# Patient Record
Sex: MALE | Race: WHITE | NOT HISPANIC OR LATINO | Employment: FULL TIME | URBAN - METROPOLITAN AREA
[De-identification: names, ages, dates, MRNs, and addresses within clinical notes are randomized per-mention and may not be internally consistent; named-entity substitution may affect disease eponyms.]

---

## 2023-01-09 ENCOUNTER — HOSPITAL ENCOUNTER (EMERGENCY)
Facility: HOSPITAL | Age: 38
Discharge: HOME | End: 2023-01-10
Attending: EMERGENCY MEDICINE | Admitting: EMERGENCY MEDICINE
Payer: COMMERCIAL

## 2023-01-09 ENCOUNTER — APPOINTMENT (OUTPATIENT)
Dept: GENERAL RADIOLOGY | Facility: HOSPITAL | Age: 38
End: 2023-01-09
Payer: COMMERCIAL

## 2023-01-09 DIAGNOSIS — R55 NEAR SYNCOPE: ICD-10-CM

## 2023-01-09 DIAGNOSIS — R42 LIGHTHEADEDNESS: Primary | ICD-10-CM

## 2023-01-09 LAB
ALBUMIN SERPL-MCNC: 4.7 G/DL (ref 3.5–5.2)
ALBUMIN/GLOB SERPL: 2.5 G/DL
ALP SERPL-CCNC: 78 U/L (ref 39–117)
ALT SERPL W P-5'-P-CCNC: 21 U/L (ref 1–41)
ANION GAP SERPL CALCULATED.3IONS-SCNC: 10 MMOL/L (ref 5–15)
AST SERPL-CCNC: 18 U/L (ref 1–40)
BASOPHILS # BLD AUTO: 0.02 10*3/MM3 (ref 0–0.2)
BASOPHILS NFR BLD AUTO: 0.3 % (ref 0–1.5)
BILIRUB SERPL-MCNC: 0.4 MG/DL (ref 0–1.2)
BUN SERPL-MCNC: 16 MG/DL (ref 6–20)
BUN/CREAT SERPL: 20.3 (ref 7–25)
CALCIUM SPEC-SCNC: 9.2 MG/DL (ref 8.6–10.5)
CHLORIDE SERPL-SCNC: 105 MMOL/L (ref 98–107)
CO2 SERPL-SCNC: 24 MMOL/L (ref 22–29)
CREAT SERPL-MCNC: 0.79 MG/DL (ref 0.76–1.27)
D DIMER PPP FEU-MCNC: <0.27 MCGFEU/ML (ref 0–0.5)
DEPRECATED RDW RBC AUTO: 40.5 FL (ref 37–54)
EGFRCR SERPLBLD CKD-EPI 2021: 116.6 ML/MIN/1.73
EOSINOPHIL # BLD AUTO: 0.06 10*3/MM3 (ref 0–0.4)
EOSINOPHIL NFR BLD AUTO: 0.8 % (ref 0.3–6.2)
ERYTHROCYTE [DISTWIDTH] IN BLOOD BY AUTOMATED COUNT: 13 % (ref 12.3–15.4)
GLOBULIN UR ELPH-MCNC: 1.9 GM/DL
GLUCOSE SERPL-MCNC: 93 MG/DL (ref 65–99)
HCT VFR BLD AUTO: 42.1 % (ref 37.5–51)
HGB BLD-MCNC: 14.7 G/DL (ref 13–17.7)
HOLD SPECIMEN: NORMAL
HOLD SPECIMEN: NORMAL
IMM GRANULOCYTES # BLD AUTO: 0.03 10*3/MM3 (ref 0–0.05)
IMM GRANULOCYTES NFR BLD AUTO: 0.4 % (ref 0–0.5)
LYMPHOCYTES # BLD AUTO: 1.95 10*3/MM3 (ref 0.7–3.1)
LYMPHOCYTES NFR BLD AUTO: 26.8 % (ref 19.6–45.3)
MCH RBC QN AUTO: 30.4 PG (ref 26.6–33)
MCHC RBC AUTO-ENTMCNC: 34.9 G/DL (ref 31.5–35.7)
MCV RBC AUTO: 87 FL (ref 79–97)
MONOCYTES # BLD AUTO: 0.57 10*3/MM3 (ref 0.1–0.9)
MONOCYTES NFR BLD AUTO: 7.8 % (ref 5–12)
NEUTROPHILS NFR BLD AUTO: 4.64 10*3/MM3 (ref 1.7–7)
NEUTROPHILS NFR BLD AUTO: 63.9 % (ref 42.7–76)
NRBC BLD AUTO-RTO: 0 /100 WBC (ref 0–0.2)
PLATELET # BLD AUTO: 198 10*3/MM3 (ref 140–450)
PMV BLD AUTO: 11 FL (ref 6–12)
POTASSIUM SERPL-SCNC: 3.5 MMOL/L (ref 3.5–5.2)
PROT SERPL-MCNC: 6.6 G/DL (ref 6–8.5)
RBC # BLD AUTO: 4.84 10*6/MM3 (ref 4.14–5.8)
SODIUM SERPL-SCNC: 139 MMOL/L (ref 136–145)
WBC NRBC COR # BLD: 7.27 10*3/MM3 (ref 3.4–10.8)
WHOLE BLOOD HOLD COAG: NORMAL
WHOLE BLOOD HOLD SPECIMEN: NORMAL

## 2023-01-09 PROCEDURE — 85025 COMPLETE CBC W/AUTO DIFF WBC: CPT

## 2023-01-09 PROCEDURE — 0202U NFCT DS 22 TRGT SARS-COV-2: CPT | Performed by: PHYSICIAN ASSISTANT

## 2023-01-09 PROCEDURE — 71045 X-RAY EXAM CHEST 1 VIEW: CPT

## 2023-01-09 PROCEDURE — 93005 ELECTROCARDIOGRAM TRACING: CPT | Performed by: PHYSICIAN ASSISTANT

## 2023-01-09 PROCEDURE — 84484 ASSAY OF TROPONIN QUANT: CPT | Performed by: PHYSICIAN ASSISTANT

## 2023-01-09 PROCEDURE — 85379 FIBRIN DEGRADATION QUANT: CPT | Performed by: PHYSICIAN ASSISTANT

## 2023-01-09 PROCEDURE — 36415 COLL VENOUS BLD VENIPUNCTURE: CPT

## 2023-01-09 PROCEDURE — 80053 COMPREHEN METABOLIC PANEL: CPT

## 2023-01-09 PROCEDURE — 99283 EMERGENCY DEPT VISIT LOW MDM: CPT

## 2023-01-09 RX ORDER — MELOXICAM 7.5 MG/1
15 TABLET ORAL AS NEEDED
COMMUNITY

## 2023-01-09 RX ORDER — SODIUM CHLORIDE 0.9 % (FLUSH) 0.9 %
10 SYRINGE (ML) INJECTION AS NEEDED
Status: DISCONTINUED | OUTPATIENT
Start: 2023-01-09 | End: 2023-01-10 | Stop reason: HOSPADM

## 2023-01-09 RX ADMIN — SODIUM CHLORIDE 1000 ML: 9 INJECTION, SOLUTION INTRAVENOUS at 23:43

## 2023-01-09 NOTE — Clinical Note
[facility] EMERGENCY DEPARTMENT  [address] [facility] [address]  Phone: [phone]    Tara Marksalyse accompanied Canelo Taylor to the emergency department on 1/9/2023. They may return to work on 01/12/2023.  Accompanied  son to Emergency Department       Thank you for choosing [facility].    Estela Abbott RN

## 2023-01-09 NOTE — Clinical Note
[facility] EMERGENCY DEPARTMENT  [address] [facility] [address]  Phone: [phone]    Canelo Taylor was seen and treated in our emergency department on 1/9/2023.  He may return to work on 01/13/2023.         Thank you for choosing [facility].    Ryan Car III, PA

## 2023-01-09 NOTE — Clinical Note
[facility] EMERGENCY DEPARTMENT  [address] [facility] [address]  Phone: [phone]    Canelo Taylor was seen and treated in our emergency department on 1/9/2023.  He may return to work on 01/13/2023.         Thank you for choosing [facility].    Ryan Car III, PA

## 2023-01-10 VITALS
OXYGEN SATURATION: 96 % | DIASTOLIC BLOOD PRESSURE: 74 MMHG | TEMPERATURE: 97.8 F | HEART RATE: 74 BPM | WEIGHT: 200 LBS | RESPIRATION RATE: 18 BRPM | BODY MASS INDEX: 27.09 KG/M2 | HEIGHT: 72 IN | SYSTOLIC BLOOD PRESSURE: 119 MMHG

## 2023-01-10 LAB
B PARAPERT DNA SPEC QL NAA+PROBE: NOT DETECTED
B PERT DNA SPEC QL NAA+PROBE: NOT DETECTED
C PNEUM DNA NPH QL NAA+NON-PROBE: NOT DETECTED
FLUAV SUBTYP SPEC NAA+PROBE: NOT DETECTED
FLUBV RNA ISLT QL NAA+PROBE: NOT DETECTED
HADV DNA SPEC NAA+PROBE: NOT DETECTED
HCOV 229E RNA SPEC QL NAA+PROBE: NOT DETECTED
HCOV HKU1 RNA SPEC QL NAA+PROBE: NOT DETECTED
HCOV NL63 RNA SPEC QL NAA+PROBE: NOT DETECTED
HCOV OC43 RNA SPEC QL NAA+PROBE: NOT DETECTED
HMPV RNA NPH QL NAA+NON-PROBE: NOT DETECTED
HPIV1 RNA ISLT QL NAA+PROBE: NOT DETECTED
HPIV2 RNA SPEC QL NAA+PROBE: NOT DETECTED
HPIV3 RNA NPH QL NAA+PROBE: NOT DETECTED
HPIV4 P GENE NPH QL NAA+PROBE: NOT DETECTED
M PNEUMO IGG SER IA-ACNC: NOT DETECTED
QT INTERVAL: 407 MS
RHINOVIRUS RNA SPEC NAA+PROBE: NOT DETECTED
RSV RNA NPH QL NAA+NON-PROBE: NOT DETECTED
SARS-COV-2 RNA NPH QL NAA+NON-PROBE: NOT DETECTED
TROPONIN T SERPL-MCNC: <0.01 NG/ML (ref 0–0.03)

## 2023-01-10 PROCEDURE — 93010 ELECTROCARDIOGRAM REPORT: CPT | Performed by: INTERNAL MEDICINE

## 2023-01-10 NOTE — ED PROVIDER NOTES
MD ATTESTATION NOTE    The SAULO and I have discussed this patient's history, physical exam, and treatment plan.  I have reviewed the documentation and personally had a face to face interaction with the patient. I affirm the documentation and agree with the treatment and plan.  The attached note describes my personal findings.      I provided a substantive portion of the care of the patient.  I personally performed the physical exam in its entirety, and below are my findings.  For this patient encounter, the patient wore surgical mask, I wore full protective PPE including N95 and eye protection.      Brief HPI: This patient is a 37-year-old male presenting to the ED today secondary to intermittent episodes of dizziness and lightheadedness that is been occurring for the past 2 weeks.  He denies any associated headache, chest pain, shortness of breath, or nausea and vomiting.    PHYSICAL EXAM  ED Triage Vitals   Temp Heart Rate Resp BP SpO2   01/09/23 2046 01/09/23 2046 01/09/23 2046 01/09/23 2103 01/09/23 2046   97.8 °F (36.6 °C) 82 18 139/82 100 %      Temp src Heart Rate Source Patient Position BP Location FiO2 (%)   -- -- -- -- --                GENERAL: Resting comfortably and in no acute distress, nontoxic in appearance  HENT: nares patent  EYES: no scleral icterus  CV: regular rhythm, normal rate, no M/R/G  RESPIRATORY: normal effort, lungs clear bilaterally  ABDOMEN: soft, nontender, no rebound or guarding  MUSCULOSKELETAL: no deformity, no edema  NEURO: alert, moves all extremities, follows commands  PSYCH:  calm, cooperative  SKIN: warm, dry    Vital signs and nursing notes reviewed.        Plan: We will obtain an EKG, labs, as well as a chest x-ray in the ED today.  We will monitor and reassess following results.       Michael Palacios MD  01/10/23 0318

## 2023-01-10 NOTE — ED TRIAGE NOTES
".Pt masked on arrival, staff masked    Pt reports episodes of lightheaded/dizzy, numbness to hands, \"hands visibly lose circulation\" that only happens when lighthead  "

## 2023-01-10 NOTE — ED PROVIDER NOTES
EMERGENCY DEPARTMENT ENCOUNTER    Room Number:  06/06  Date seen:  1/10/2023  PCP: Provider, No Known      HPI:  Chief Complaint: Dizziness  A complete HPI/ROS/PMH/PSH/SH/FH are unobtainable due to: Nothing  Context: Canelo Taylor is a 37 y.o. male who presents to the ED c/o dizziness that has been intermittent and ongoing for 2 weeks.  Patient states usually occurs at work when he is standing on his feet.  However, today he was at his child's birthday party and was allegedly sitting down and had an episode of near syncope.  He states there is no associated nausea or diaphoresis associated with the near syncopal episodes.  He does get very lightheaded vision gets a little off and he can tell as if he is about to pass out.  He denies associated palpitations, chest pain, shortness of breath, unilateral leg swelling, headache, fever, chills, neck pain associated with the symptoms.  He does state that 2 weeks ago he became very sick with URI-like symptoms prior to the onset of the near syncopal episode    Patient also complains of episodes of painful pale appearing fingers that has been intermittent over the same course of time.  Mother states she has a history of Raynaud's and what the patient is describing sounds very similar.          PAST MEDICAL HISTORY  Active Ambulatory Problems     Diagnosis Date Noted   • Lateral epicondylitis of left elbow 09/08/2016   • Obesity (BMI 30.0-34.9) 09/08/2016     Resolved Ambulatory Problems     Diagnosis Date Noted   • No Resolved Ambulatory Problems     No Additional Past Medical History         PAST SURGICAL HISTORY  Past Surgical History:   Procedure Laterality Date   • CYST REMOVAL     • TENDON REPAIR           FAMILY HISTORY  Family History   Problem Relation Age of Onset   • Hypertension Mother    • No Known Problems Father          SOCIAL HISTORY  Social History     Socioeconomic History   • Marital status:    Tobacco Use   • Smoking status: Never   • Smokeless  tobacco: Never   Substance and Sexual Activity   • Alcohol use: No   • Drug use: No         ALLERGIES  Septra [sulfamethoxazole-trimethoprim]        REVIEW OF SYSTEMS  Review of Systems     All systems reviewed and negative except for those discussed in HPI.       PHYSICAL EXAM  ED Triage Vitals   Temp Heart Rate Resp BP SpO2   01/09/23 2046 01/09/23 2046 01/09/23 2046 01/09/23 2103 01/09/23 2046   97.8 °F (36.6 °C) 82 18 139/82 100 %      Temp src Heart Rate Source Patient Position BP Location FiO2 (%)   -- -- -- -- --              Physical Exam      GENERAL: WDWN male, no acute distress  HENT: nares patent  EYES: no scleral icterus  CV: regular rhythm, normal rate, normal S1-S2, no rubs murmurs gallops  RESPIRATORY: normal effort  ABDOMEN: soft  MUSCULOSKELETAL: no deformity  NEURO: alert, moves all extremities, follows commands  PSYCH:  calm, cooperative  SKIN: warm, dry    Vital signs and nursing notes reviewed.          LAB RESULTS  Recent Results (from the past 24 hour(s))   Comprehensive Metabolic Panel    Collection Time: 01/09/23  9:09 PM    Specimen: Blood   Result Value Ref Range    Glucose 93 65 - 99 mg/dL    BUN 16 6 - 20 mg/dL    Creatinine 0.79 0.76 - 1.27 mg/dL    Sodium 139 136 - 145 mmol/L    Potassium 3.5 3.5 - 5.2 mmol/L    Chloride 105 98 - 107 mmol/L    CO2 24.0 22.0 - 29.0 mmol/L    Calcium 9.2 8.6 - 10.5 mg/dL    Total Protein 6.6 6.0 - 8.5 g/dL    Albumin 4.7 3.5 - 5.2 g/dL    ALT (SGPT) 21 1 - 41 U/L    AST (SGOT) 18 1 - 40 U/L    Alkaline Phosphatase 78 39 - 117 U/L    Total Bilirubin 0.4 0.0 - 1.2 mg/dL    Globulin 1.9 gm/dL    A/G Ratio 2.5 g/dL    BUN/Creatinine Ratio 20.3 7.0 - 25.0    Anion Gap 10.0 5.0 - 15.0 mmol/L    eGFR 116.6 >60.0 mL/min/1.73   CBC Auto Differential    Collection Time: 01/09/23  9:09 PM    Specimen: Blood   Result Value Ref Range    WBC 7.27 3.40 - 10.80 10*3/mm3    RBC 4.84 4.14 - 5.80 10*6/mm3    Hemoglobin 14.7 13.0 - 17.7 g/dL    Hematocrit 42.1 37.5 - 51.0  %    MCV 87.0 79.0 - 97.0 fL    MCH 30.4 26.6 - 33.0 pg    MCHC 34.9 31.5 - 35.7 g/dL    RDW 13.0 12.3 - 15.4 %    RDW-SD 40.5 37.0 - 54.0 fl    MPV 11.0 6.0 - 12.0 fL    Platelets 198 140 - 450 10*3/mm3    Neutrophil % 63.9 42.7 - 76.0 %    Lymphocyte % 26.8 19.6 - 45.3 %    Monocyte % 7.8 5.0 - 12.0 %    Eosinophil % 0.8 0.3 - 6.2 %    Basophil % 0.3 0.0 - 1.5 %    Immature Grans % 0.4 0.0 - 0.5 %    Neutrophils, Absolute 4.64 1.70 - 7.00 10*3/mm3    Lymphocytes, Absolute 1.95 0.70 - 3.10 10*3/mm3    Monocytes, Absolute 0.57 0.10 - 0.90 10*3/mm3    Eosinophils, Absolute 0.06 0.00 - 0.40 10*3/mm3    Basophils, Absolute 0.02 0.00 - 0.20 10*3/mm3    Immature Grans, Absolute 0.03 0.00 - 0.05 10*3/mm3    nRBC 0.0 0.0 - 0.2 /100 WBC   Green Top (Gel)    Collection Time: 01/09/23  9:09 PM   Result Value Ref Range    Extra Tube Hold for add-ons.    Lavender Top    Collection Time: 01/09/23  9:09 PM   Result Value Ref Range    Extra Tube hold for add-on    Gold Top - SST    Collection Time: 01/09/23  9:09 PM   Result Value Ref Range    Extra Tube Hold for add-ons.    Light Blue Top    Collection Time: 01/09/23  9:09 PM   Result Value Ref Range    Extra Tube Hold for add-ons.    D-dimer, Quantitative    Collection Time: 01/09/23  9:09 PM    Specimen: Blood   Result Value Ref Range    D-Dimer, Quantitative <0.27 0.00 - 0.50 MCGFEU/mL   Troponin    Collection Time: 01/09/23  9:09 PM    Specimen: Blood   Result Value Ref Range    Troponin T <0.010 0.000 - 0.030 ng/mL   Respiratory Panel PCR w/COVID-19(SARS-CoV-2) ERINN/BRIT/SARAH/PAD/COR/MAD/KAREL In-House, NP Swab in UTM/VTM, 3-4 HR TAT - Swab, Nasopharynx    Collection Time: 01/09/23 11:49 PM    Specimen: Nasopharynx; Swab   Result Value Ref Range    ADENOVIRUS, PCR Not Detected Not Detected    Coronavirus 229E Not Detected Not Detected    Coronavirus HKU1 Not Detected Not Detected    Coronavirus NL63 Not Detected Not Detected    Coronavirus OC43 Not Detected Not Detected     COVID19 Not Detected Not Detected - Ref. Range    Human Metapneumovirus Not Detected Not Detected    Human Rhinovirus/Enterovirus Not Detected Not Detected    Influenza A PCR Not Detected Not Detected    Influenza B PCR Not Detected Not Detected    Parainfluenza Virus 1 Not Detected Not Detected    Parainfluenza Virus 2 Not Detected Not Detected    Parainfluenza Virus 3 Not Detected Not Detected    Parainfluenza Virus 4 Not Detected Not Detected    RSV, PCR Not Detected Not Detected    Bordetella pertussis pcr Not Detected Not Detected    Bordetella parapertussis PCR Not Detected Not Detected    Chlamydophila pneumoniae PCR Not Detected Not Detected    Mycoplasma pneumo by PCR Not Detected Not Detected   ECG 12 Lead Syncope    Collection Time: 01/10/23 12:24 AM   Result Value Ref Range    QT Interval 407 ms       Ordered the above labs and reviewed the results.        RADIOLOGY  XR Chest 1 View    Result Date: 1/10/2023  SINGLE VIEW OF THE CHEST  HISTORY: Dizziness  COMPARISON: None available.  FINDINGS: Heart size is within normal limits. No pneumothorax, pleural effusion, or acute infiltrate is seen.      No acute findings.  This report was finalized on 1/10/2023 12:35 AM by Dr. Khalida Rinaldi M.D.        Ordered the above noted radiological studies. Reviewed by me in PACS.          PROCEDURES  Procedures          MEDICATIONS GIVEN IN ER  Medications   sodium chloride 0.9 % bolus 1,000 mL (0 mL Intravenous Stopped 1/10/23 0230)           MEDICAL DECISION MAKING, PROGRESS, and CONSULTS    All labs have been independently reviewed by me.  All radiology studies have been reviewed by me and discussed with radiologist dictating the report.   EKG's independently viewed and interpreted by me.  Discussion below represents my analysis of pertinent findings related to patient's condition, differential diagnosis, treatment plan and final disposition.      Orders placed during this visit:  Orders Placed This Encounter    Procedures   • Respiratory Panel PCR w/COVID-19(SARS-CoV-2) ERINN/BRIT/SARAH/PAD/COR/MAD/KAREL In-House, NP Swab in UTM/VTM, 3-4 HR TAT - Swab, Nasopharynx   • XR Chest 1 View   • Comprehensive Metabolic Panel   • [place] Draw   • CBC Auto Differential   • D-dimer, Quantitative   • Troponin   • Ambulatory Referral to Family Practice   • Pulse Oximetry, Continuous   • ECG 12 Lead Syncope   • CBC & Differential   • Green Top (Gel)   • Lavender Top   • Gold Top - SST   • Light Blue Top         Additional sources:  - Discussed/obtained information from independent historians:      Additional information was obtained to confirm the patient's history.    - External (non-ED) record review: Reviewed immediate care note from 3/15/2020.  Patient had a sebaceous cyst on his body.  It appeared to be irritated and the patient was diagnosed with phlegmon.  It appears he was treated with antibiotics and symptoms subsided without difficulty.    - Chronic or social conditions impacting care: None      Differential diagnosis:    DDx: Vasovagal near syncope, orthostatic near syncope, diabetes, other metabolic disturbance, dehydration, cardiogenic related near syncope, PE, CNS related near syncope, underlying viral or bacterial illness with associated near syncope.  Will obtain CBC, CMP, troponin, EKG, portable chest x-ray, respiratory panel, magnesium, orthostatic vitals to further evaluate.    Additional orders considered but not ordered:          Independent interpretation of labs, radiology studies, and discussions with consultants:  ED Course as of 01/10/23 0526   Tue Yovani 10, 2023   0056 EKG          EKG time: 1224  Rhythm/Rate: 70/ sinus  P waves and MS: nl pr interval  QRS, axis: Normal axis  ST and T waves: No acute ST or T wave changes noted.  There is some early repolarization changes    Interpreted Contemporaneously by me, independently viewed  -No prior to compare [RC]   0201 WBC: 7.27 [RC]   0201 RBC: 4.84 [RC]   0201  Hemoglobin: 14.7 [RC]   0201 Hematocrit: 42.1 [RC]   0202 Platelets: 198 [RC]   0202 Glucose: 93 [RC]   0202 BUN: 16 [RC]   0202 Creatinine: 0.79 [RC]   0202 Sodium: 139 [RC]   0202 Potassium: 3.5 [RC]   0202 CO2: 24.0 [RC]   0202 Anion Gap: 10.0 [RC]   0202 Troponin T: <0.010 [RC]   0203 COVID19: Not Detected [RC]   0203 Influenza A PCR: Not Detected [RC]   0203 Influenza B PCR: Not Detected [RC]   0203 Glucose: 93 [RC]   0203 BUN: 16 [RC]   0203 Creatinine: 0.79 [RC]   0203 Sodium: 139 [RC]   0203 Potassium: 3.5 [RC]   0203 CO2: 24.0 [RC]   0203 Anion Gap: 10.0 [RC]   0203 D-Dimer, Quant: <0.27 [RC]   0203 Troponin T: <0.010 [RC]   0203 EKG          EKG time: 1224  Rhythm/Rate: 70/sinus  P waves and WV: Normal WV interval  QRS, axis: Normal axis  ST and T waves: No acute ST or T wave changes noted    Interpreted Contemporaneously by me, independently viewed  -No prior to compare [RC]   0204 Chest x-ray shows no acute process. [RC]   0204 Patient's work-up is unremarkable in the emergency department.  We will rule out ACS, dehydration, orthostatic hypotension, diabetes, other electrolyte disturbance, PE, PNA, other pulmonary pathology, acute arrhythmia, prolonged QT interval, viral infection, hypotension.  Suspect a more benign process at play.  We will have the patient to follow-up with cardiology for further evaluation given the recurrent and ongoing symptoms.  1 return to the emergency department with worsening symptoms, new symptoms, or should he have any further concerns.  In regards to the possible Raynaud's we will have the patient to follow-up with his family physician for further evaluation. [RC]   0317 Chloride: 105 [BM]      ED Course User Index  [BM] Michael Palacios MD  [RC] Ryan Car III, PA                PPE: The patient wore a mask throughout the entire encounter. I wore a well-fitting mask.    DIAGNOSIS  Final diagnoses:   Lightheadedness   Near syncope          DISPOSITION    DISCHARGE    Patient discharged in stable condition.    Reviewed implications of results, diagnosis, meds, responsibility to follow up, warning signs and symptoms of possible worsening, potential complications and reasons to return to ER.    Patient/Family voiced understanding of above instructions.    Discussed plan for discharge, as there is no emergent indication for admission. Patient referred to primary care provider for BP management due to today's BP. Pt/family is agreeable and understands need for follow up and repeat testing.  Pt is aware that discharge does not mean that nothing is wrong but it indicates no emergency is present that requires admission and they must continue care with follow-up as given below or physician of their choice.     FOLLOW-UP  [facility] CARDIOLOGY  [address] [facility] [address]  [place] [address] [phone]  Schedule an appointment as soon as possible for a visit   For further evaluation and treatment    [facility] HOSPITALIST PROVIDER  [address]  [place] [facility] [id number] [phone]  Schedule an appointment as soon as possible for a visit   For further evaluation and treatment of all other issues.         Medication List      No changes were made to your prescriptions during this visit.       Latest Documented Vital Signs:  As of 05:26 EST  BP- 119/74 HR- 74 Temp- 97.8 °F (36.6 °C) O2 sat- 96%      --    Please note that portions of this were completed with a voice recognition program.       Note Disclaimer: At [facility], we believe that sharing information builds trust and better relationships. You are receiving this note because you are receiving care at [facility] or recently visited. It is possible you will see health information before a provider has talked with you about it. This kind of information can be easy to misunderstand. To help you fully understand what it means for your  health, we urge you to discuss this note with your provider.       Ryan Car III, PA  01/10/23 0526